# Patient Record
Sex: FEMALE | Race: WHITE | Employment: FULL TIME | URBAN - METROPOLITAN AREA
[De-identification: names, ages, dates, MRNs, and addresses within clinical notes are randomized per-mention and may not be internally consistent; named-entity substitution may affect disease eponyms.]

---

## 2017-11-15 ENCOUNTER — HOSPITAL ENCOUNTER (OUTPATIENT)
Dept: NUTRITION | Age: 62
Discharge: HOME | End: 2017-11-15
Payer: COMMERCIAL

## 2017-11-15 PROCEDURE — G0109 DIAB MANAGE TRN IND/GROUP: HCPCS

## 2017-11-16 NOTE — PROGRESS NOTES
[facility] Outpatient Diabetes Self-Management Education Program      Dsme RECORD and PROGRESS NOTE         Patient[de-identified] Will Primus : 1955         Physician: Kristina Kelley* Date: 11/15/2017     VISIT No: [x]1   []2   []3   []4   []5   []6               [x] Initial         [] Subsequent Year ACTUAL PROGRESS NOTE    A1C: 6.9  Date A1C drawn: 10-       [x] Initial     [] Updated  BMI:  58.1  Height: 67  Weight: 371       [x] Initial     [] Updated                   Weight:   [] Up    [] Down  lbs (Only if follow up visit)     Diabetes Meds:  [] None   [x] List: (Provide list at initial visit only list changes at follow up visits)  Metformin ER, Glipizide  [] No change       Other Meds:  [x] BP   [x] lipids          [] depression/anxiety        [x] other:  Thyroid, cholesterol, LOsartan, CoQ10      *Behavior Goals Set Related To:  (List at initial visit only and add any new goals, if any, from follow up visits.)  [] *Healthy eating     [] *Being active  [x] *Taking meds       [] *Reducing risks  [x] *Monitoring           [] *Healthy coping  [] *Problem-solving  []  Other:  [] Separate Behavior Form Attached (use to assess progress on previously set goals, as well)        Notes: Pt attended Class I of a 3 class DSME series.   Was attentive and showed motivation                [] PHYSICIAN ACTION REQUESTED    [] See Addendum   [x] Program schedule and visit dates            Hours furnished:      [] 0.5     [] 1    [] 1.5    [] 2     [] 2.5     [x] 3    Format:   [] Individual       [x] Group    Also present:   []  Family:     [] Friend:     [] Caregiver:        [] Other:                  [x] Individual assessment              [x] Individual education plan and needs     [x] BG goals and targets        [x] Diabetes outcomes (clinical goals)      [x] Behavior goals:               [x] set goals  [] review achievement      [x] A1c test, meaning and goal     [] SMBG*         [] SMBG schedule     [] BG meter receipt     Type/name of:                                    [] BG meter review     [x] Diabetes biology                                   [x] What increases/decreases BG           [] Diabetes identification (on body, in wallet/purse)     [x] Healthy eating*                            [x] Body weight: [] healthy wt             [] risks when overwt     [] Food + BG logs:  [] set goals              [] reviewed logs     [] Pattern management with pts logs         [] Taking meds*         [] OTC meds                [] Sick day care       [x] Being active*          [] Pattern management      [] Reducing risks: hypo & hyperglycemia       [] Reducing risks*: chronic complications     [] Healthy coping & stress management*       [] Foot care       [] Obstacles to behavior change      [] Behavior goals achievement      [] Other diabetes outcomes               [] Reducing risks*: DKA + NKHHS                [] Problem solving*        [] Foot care       [] DSM support plan      [] Pattern management     [] DSME follow-up in subsequent years      []  Monitor health status:             []  eye exam    []  Lipids            [] BP            [] urine microalbumin             [] GFR              [] other:        []  Review initial assessment; correct knowledge deficits      []  Patient--selected topics:          []  Other topic(s):           Educator Signature: Mana Thomas RD, 11/15/2017 7:22 PM

## 2017-12-06 ENCOUNTER — HOSPITAL ENCOUNTER (OUTPATIENT)
Dept: NUTRITION | Age: 62
Discharge: HOME | End: 2017-12-06
Payer: COMMERCIAL

## 2017-12-06 PROCEDURE — G0109 DIAB MANAGE TRN IND/GROUP: HCPCS

## 2017-12-07 NOTE — PROGRESS NOTES
[facility] Diabetes Self-Management Education [facility]      Dsme RECORD and PROGRESS NOTE         Patient: Coleen Adams : 1955         Physician: Rosana Florian* Date: 2017     VISIT No: []1 [x]2 []3 []4 []5 []6               [x] Initial                   [] Subsequent Year ACTUAL hours furnished: PROGRESS NOTE    A1C  Date A1C drawn:        [] Initial     [] Updated    Height:   Weight:                 Date:   BMI:        [] Initial     [] Updated                   Weight Change:  [] Up    [] Down   lbs (Only for follow-up visit)     Diabetes Meds:  [] None   [] List: (Provide list at initial visit only list changes at follow-up visits)  [x] No change   (But Pt is taking more consistently)    Other Meds:  [] BP   [] lipids          [] depression/anxiety        [] other:        Behavior Goals Set Related To:   (List at initial visit only and add any new goals, if any, from follow-up visits)  [] Healthy eating      [] Being active  [] Taking meds        [] Monitoring   [] Reducing risks     [] Healthy coping  [] Problem-solving  []  Other:    [x] Separate Behavior Form Attached (use to assess progress on previously set goals)      [] Reviewed achievement of goals    Notes: Pt attended her 2nd class of the 3 class DSME series. Had to miss Class II (was out of town). Will be meeting with me individually on Friday, 2017 to go over the lessons from last week (Medications and Monitoring) and I will also instruct her on the use of her new blood glucose meter. Pt will then have follow up nutrition counseling (MNT). Pt remains motivated.                    [] Program schedule and visit dates                  [] 0.5     [] 1    [] 1.5    [x] 2     [] 2.5     [] 3    Format:   [] Individual       [x] Group    Also present:   []  Family:     [] Friend:     [] Caregiver:        [] Other:                  [] Behavior goals:              [] set goals  [] review achievement     [] Prediabetes and Diabetes biology & diagnosis     [] BG goals and targets        [] A1c test, meaning and goal     [] Diabetes causes & risk factors        [] Purpose of Healthy Eating & tips     [] Body weight: [] healthy wt             [] risks when overwt/obese     [] Carbohydrate food sources & counting     [] Lean proteins & healthy fats     [] How foods/drinks affect BG     [] How to read a Food Label     [] Healthy Plate Method     [] Benefits of Being Active     [] Different types of exercise     [] Recommendations & safety issues for exercise     [x] Diabetes identification (on body, in wallet/purse)     [x] Obstacles to behavior change     [] S.M.A.R.T.  Goals     [] Taking medications:      [] oral   []  injections   [] insulin     [] Understanding types of meds     [] Proper storage and handling of meds     [] OTC and alternative meds     [] Purpose & benefits of Monitoring     [] Causes, prevention & treatment for hypo- & hyperglycemia     [] General guidelines for monitoring     [] SMBG and monitoring schedule     [] BG logs       [] reviewed logs     [x] Pattern management with SMBG log     [] BG meter receipt              Type/name of:                                    [] BG meter review     [x] Reducing risks to prevent chronic complications     [x]  Monitor health status:             [x]  Eye exam    [x] BP                        [x]  Lipids/heart disease            [x]  Nerve/Neuropathy             [x] Foot care              [x]  Dental      [x] Sleep                    [x]  Kidney      [] other:     [x] Obstacles to behavior change      [x] Problem solving skills to overcome barriers and challenges     [x] The process and tips for problem solving     [x] Problem solving for hypo- & hyperglycemia     [x] Sick Day Management     [x] Healthy Coping & stress management     [x] DSM support plan      [] Individual assessment     [] Individual education plan and needs     []  Review initial assessment; correct knowledge deficits      []  Patient-selected topics:          []  Other topic(s):           Educator Signature: Sheron Grant RD, 12/6/2017 7:08 PM

## 2017-12-08 ENCOUNTER — HOSPITAL ENCOUNTER (OUTPATIENT)
Dept: NUTRITION | Age: 62
Discharge: HOME | End: 2017-12-08
Payer: COMMERCIAL

## 2017-12-08 PROCEDURE — G0108 DIAB MANAGE TRN  PER INDIV: HCPCS

## 2017-12-08 NOTE — PROGRESS NOTES
[facility] Outpatient Diabetes Self-Management Education Program      Dsme RECORD and PROGRESS NOTE         Patient: Matthew Cline : 1955         Physician: Humble Salter* Date: 2017     VISIT No: []1 []2 [x]3 []4 []5 []6               [x] Initial                   [] Subsequent Year ACTUAL hours furnished: PROGRESS NOTE    A1C: Will be drawn in early   Date A1C drawn:        [] Initial     [] Updated    Height: 67  Weight: 370               Date: 2017  BMI: 57.9       [] Initial     [x] Updated                   Weight Change:  [] Up    [x] Down   1 lbs (Only for follow-up visit)     Diabetes Meds:  [] None   [] List: (Provide list at initial visit only list changes at follow-up visits)  [x] No change       Other Meds:  [] BP   [] lipids          [] depression/anxiety        [] other:        Behavior Goals Set Related To:   (List at initial visit only and add any new goals, if any, from follow-up visits)  [] Healthy eating      [] Being active  [] Taking meds        [] Monitoring   [] Reducing risks     [] Healthy coping  [] Problem-solving  []  Other:    [x] Separate Behavior Form Attached (use to assess progress on previously set goals)      [x] Reviewed achievement of goals    Notes: Pt attended her 3rd session of the 3 class DSME series. Pt had missed a class so pt attended a 1:1 education session on Monitoring and Medications. Instructed pt on the use of her meter-pt demonstrated proper technique. Pt remains motivated and will start Medical Nutrition therapy next week.                  [] Program schedule and visit dates                  [] 0.5     [] 1    [] 1.5    [x] 2     [] 2.5     [] 3    Format:   [x] Individual       [] Group    Also present:   []  Family:     [] Friend:     [] Caregiver:        [] Other:                  [x] Behavior goals:              [] set goals  [x] review achievement     [] Prediabetes and Diabetes biology & diagnosis     [x] BG goals and targets        [x] A1c test, meaning and goal     [] Diabetes causes & risk factors        [] Purpose of Healthy Eating & tips     [] Body weight: [] healthy wt             [] risks when overwt/obese     [] Carbohydrate food sources & counting     [] Lean proteins & healthy fats     [] How foods/drinks affect BG     [] How to read a Food Label     [] Healthy Plate Method     [] Benefits of Being Active     [] Different types of exercise     [] Recommendations & safety issues for exercise     [x] Diabetes identification (on body, in wallet/purse)     [] Obstacles to behavior change     [] S.M.A.R.T.  Goals     [x] Taking medications:      [x] oral   [x]  injections   [x] insulin     [x] Understanding types of meds     [x] Proper storage and handling of meds     [x] OTC and alternative meds     [x] Purpose & benefits of Monitoring     [x] Causes, prevention & treatment for hypo- & hyperglycemia     [x] General guidelines for monitoring     [x] SMBG and monitoring schedule     [] BG logs       [] reviewed logs     [x] Pattern management with SMBG log     [] BG meter receipt              Type/name of:                                    [x] BG meter review-Taught pt to use     [] Reducing risks to prevent chronic complications     []  Monitor health status:             []  Eye exam    [] BP                        []  Lipids/heart disease            []  Nerve/Neuropathy             [] Foot care              []  Dental      [] Sleep                    []  Kidney      [] other:     [] Obstacles to behavior change      [] Problem solving skills to overcome barriers and challenges     [] The process and tips for problem solving     [x] Problem solving for hypo- & hyperglycemia     [] Sick Day Management     [] Healthy Coping & stress management     [] DSM support plan      [] Individual assessment     [] Individual education plan and needs     []  Review initial assessment; correct knowledge deficits      []  Patient-selected topics:          []  Other topic(s):           Educator Signature: Qing Barnes RD, 12/8/2017 2:11 PM

## 2017-12-15 ENCOUNTER — HOSPITAL ENCOUNTER (OUTPATIENT)
Dept: NUTRITION | Age: 62
Discharge: HOME | End: 2017-12-15
Payer: COMMERCIAL

## 2017-12-15 PROCEDURE — 97802 MEDICAL NUTRITION INDIV IN: CPT

## 2017-12-15 NOTE — PROGRESS NOTES
[address] [place] [address] 51, 45 [facility], [address] [place] [address]  Phone: [phone]  Fax: [phone]   Nutrition Assessment  Medical Nutrition Therapy   Outpatient Initial Evaluation         Patient Name: Alexander Banda : 1955   Treatment Diagnosis: Diabetes, Morbid Obesity   Referral Source: Adonis Yung* Start of Care [facility]): 12/15/2017     Gender: female Age: 58 y.o. Ht: 67 in Wt:  371 lb  kg   BMI: 58.1 BMR   Male  BMR Female 58.1   Anthropometrics Assessment: Current BMI puts pt in the morbid obesity catagory     Past Medical History includes: Graves Disease, then Hypothyroidism, Hypertension, Heart Disease, Binge Eating Disorder     Pertinent Medications:   Includes Metformin 2500, Glypizide 5 mg, Atorvastatin 80 mg, Losartan,      Biochemical Data:   No results found for: HBA1C, HGBE8, PJJ3HYLI, LDS9UXDM  No results found for: CHOL, CHOLPOCT, CHOLX, CHLST, CHOLV, HDL, HDLPOC, LDL, LDLCPOC, LDLC, DLDLP, VLDLC, VLDL, TGLX, TRIGL, TRIGP, TGLPOCT, CHHD, CHHDX  No results found for: GPT, ALT, SGOT, GGT, GGTP, AP, APIT, APX, CBIL, TBIL, TBILI  No results found for: ALEJANDRA, CREAPOC, MCREA, ACREA, CREA, REFC3, REFC4  No results found for: BUN, BUNPOC, IBUN, MBUNV, BUNV  No results found for: MCACR, MCA1, MCA2, MCA3, MCAU, MCAU2, MCALPOCT     Subjective/Assessment:   Pt is a 58 y.o. Female. Single-two grown children. Lives alone. Moved to the area about a year ago from [facility] to take a job. Diagnosed with Type II DM in the early . Has struggled with her weight her entire adult life-has had residential treatment for binge eating. Also had bariatric surgery in the late . Attended Diabetes Self-Management Education classes with  last month. Has purchased a blood glucose meter and has begun testing her blood glucose since finishing up classes. No regular exercise.        Current Eating Patterns: Pt eats most of her meals out-drive through fast food during the day. Meals are excessive in carbohydrates. Occasionally purchases binge foods and eats them later in the evening-c/o hunger hitting her quickly-questions whether this is \"dumping syndrome\" leftover from her past bariatric surgery. Primarily calorie free beverages. Estimate Needs   Calories:  1550 Protein: 94 Carbs: 174 Fat: 52   Kcal/day  g/day  g/day  g/day        percent: 25  45  30               Education & Recommendations provided: -  -Reviewed the pathophysiology of Type II Diabetes and insulin resistance and the rationale for dietary modification and increased activity  -Encouraged pt not to go longer than 5 hours without eating but to space meals/snacks at least 3 hours apart.    -Educated pt on all carbohydrates found in foods and encouraged no more than 35-40 gm/meal and 15-20 gm/snack.  -Encouraged patient to food journal at least 3 times per week to capture hidden calories and carbohydrates and eating patterns.         Handouts Provided: [x]  Carbohydrates  [x]  Protein  [x]  Fiber  [x]  Serving Sizes  []  Meal and Snack Ideas  [x]  Food Journals [x]  Diabetes  [x]  Cholesterol  [x]  Sodium  [x]  Gen Nutr Guidelines  [x]  SBGM Guidelines  [x]  Others: Healthy Eating out Guidelines   Information Reviewed with: Pt   Readiness to Change Stage: []  Pre-contemplative    []  Contemplative  []  Preparation               [x]  Action                  []  Maintenance   Potential Barriers to Learning: []  Decline in memory    []  Language barrier   []  Other:  []  Emotional                  []  Limited mobility  []  Lack of motivation     [] Vision, hearing or cognitive impairment   Expected Compliance: Fair     Nutritional Goal - To promote lifestyle changes to result in:    [x]  Weight loss  [x]  Improved diabetic control  [x]  Decreased cholesterol levels  [x]  Decreased blood pressure  []  Weight maintenance []  Preventing any interactions associated with food allergies  []  Adequate weight gain toward goal weight  []  Other:        Patient Goals:  SMART goals -Pt will test her blood glucose at least 1x/d and vary the times.  -Pt will use [employer] and record her eating completely at least 3x/wk-Including 2 work days and 1 off day     Dietitian Signature: Jerri Doyle Date: 12/15/2017   Follow-up: Thursday, 1-4-2018 at 6 p.m.  Time: 1:32 PM

## 2018-01-04 ENCOUNTER — APPOINTMENT (OUTPATIENT)
Dept: NUTRITION | Age: 63
End: 2018-01-04
Payer: COMMERCIAL

## 2018-01-18 ENCOUNTER — HOSPITAL ENCOUNTER (OUTPATIENT)
Dept: NUTRITION | Age: 63
Discharge: HOME | End: 2018-01-18
Payer: COMMERCIAL

## 2018-01-18 PROCEDURE — 97803 MED NUTRITION INDIV SUBSEQ: CPT

## 2018-01-18 NOTE — PROGRESS NOTES
NUTRITION  FOLLOW-UP TREATMENT NOTE  Patient Name: Paulette Solano         Date: 2018  : 1955    YES/NO Patient  Verified  Diagnosis: Diabetes, Morbid Obesity   In time:   600             Out time:   645   Total Treatment Time (min):   45     SUBJECTIVE/ASSESSMENT    Changes in medication or medical history? Any new allergies, surgeries or procedures? YES/NO    If yes, update Summary List   No changes in medication. Pt has been testing her BG every day but usually first thing in the morning. No exercise yet. Has consistently logged her intake in [employer] and is controlling the carbohydrates at meals and snacks. Current Wt: 245.4 Previous Wt: 271 Wt Change: -25.6#     Achievement of Goals: -Pt has partially achieved her goal of testing her BG-just not varying the times.  -Pt achieved her food journaling goals      Patient Education:  [x]  Review current plan with patient   []  Other:    Handouts/  Information Provided: []  Carbohydrates  []  Protein  []  Fiber  []  Serving Sizes  []  Fluids  []  General guidelines []  Diabetes  []  Cholesterol  []  Sodium  [x]  SBGM  [x]  Food Journals  []  Others:      New Patient Goals: -Pt will try walking an extra 15 min/day  -Pt will get at least 1 other BG testing time per week. PLAN    []  Continue on current plan []  Follow-up PRN   []  Discharge due to :    [x]  Next appt: 2018 at 6 p.m.      Dietitian: Jose Cruz Robbins MS,RD,CDE    Date: 2018 Time: 5:50 PM

## 2018-03-15 ENCOUNTER — HOSPITAL ENCOUNTER (OUTPATIENT)
Dept: NUTRITION | Age: 63
Discharge: HOME | End: 2018-03-15
Payer: COMMERCIAL

## 2018-03-15 PROCEDURE — 97803 MED NUTRITION INDIV SUBSEQ: CPT

## 2018-03-15 NOTE — PROGRESS NOTES
NUTRITION  FOLLOW-UP TREATMENT NOTE  Patient Name: Fatou Villalobos         Date: 3/15/2018  : 1955    YES/NO Patient  Verified  Diagnosis: Diabetes, Mor Obesity   In time:   600             Out time:   645   Total Treatment Time (min):   45     SUBJECTIVE/ASSESSMENT    Changes in medication or medical history? Any new allergies, surgeries or procedures? YES/NO    If yes, update Summary List   Pt will be following up with her PCP at the end of March and having an updated A1C. Pt occ going too long without eating. C/o of boredom with some breakfast ideas. Future work schedule will bring extra food into her office. Current Wt: 324.8 Previous Wt: 332 Wt Change: -7.2#     Achievement of Goals: -Pt partially achieved her goal of adding different BG testing times  -Pt partially achieved her goal of walking 15 min, 4x/wk. Did get a \"Pedaler\" to pedal while sitting down     Patient Education:  [x]  Review current plan with patient   []  Other: Developed more breakfast ideas with pt containing a protein source and 15-30 gms/carbs. Handouts/  Information Provided: []  Carbohydrates  []  Protein  []  Fiber  []  Serving Sizes  []  Fluids  []  General guidelines []  Diabetes  []  Cholesterol  []  Sodium  []  SBGM  []  Food Journals  []  Others:      New Patient Goals: -Pt will Pedal or walk for 15 minutes at least 4x/week  -     PLAN    []  Continue on current plan []  Follow-up PRN   []  Discharge due to :    [x]  Next appt: 2018 at 6 p.m.      Dietitian: Bharati Berumen MS,RD,CDE    Date: 3/15/2018 Time: 6:01 PM

## 2018-05-24 ENCOUNTER — APPOINTMENT (OUTPATIENT)
Dept: NUTRITION | Age: 63
End: 2018-05-24
Payer: COMMERCIAL

## 2018-05-29 ENCOUNTER — HOSPITAL ENCOUNTER (OUTPATIENT)
Dept: NUTRITION | Age: 63
Discharge: HOME | End: 2018-05-29
Payer: COMMERCIAL

## 2018-05-29 PROCEDURE — 97803 MED NUTRITION INDIV SUBSEQ: CPT

## 2018-05-29 NOTE — PROGRESS NOTES
NUTRITION  FOLLOW-UP TREATMENT NOTE  Patient Name: Guera Bonilla         Date: 2018  : 1955    YES/NO Patient  Verified  Diagnosis: Diabetes, Mor Obesity   In time:   600             Out time:   645   Total Treatment Time (min):   45     SUBJECTIVE/ASSESSMENT    Changes in medication or medical history? Any new allergies, surgeries or procedures? YES/NO    If yes, update Summary List   Pt had a cardioversion procedure and heartbeat has been more regular. Metformin dose decreased to 2000 mg. Pt has been walking more. Pt will be doing traveling over the next month-some parties        Current Wt: 287.2 Previous Wt: 307.8 Wt Change: -20.6#     Achievement of Goals: -Pt has decreased the sodium in her lunch meals     Patient Education:  [x]  Review current plan with patient   []  Other:    Handouts/  Information Provided: []  Carbohydrates  []  Protein  []  Fiber  []  Serving Sizes  []  Fluids  []  General guidelines []  Diabetes  []  Cholesterol  []  Sodium  []  SBGM  []  Food Journals  []  Others:      New Patient Goals: -Pt will challenge herself with a \"splurge\" food at least 2 times when traveling and get back on track. PLAN    []  Continue on current plan []  Follow-up PRN   []  Discharge due to :    [x]  Next appt: 2018 at 6 p.m.      Dietitian: Oscar Olmos MS,RD,CDE    Date: 2018 Time: 5:56 PM

## 2018-07-12 ENCOUNTER — HOSPITAL ENCOUNTER (OUTPATIENT)
Dept: NUTRITION | Age: 63
Discharge: HOME | End: 2018-07-12
Payer: COMMERCIAL

## 2018-07-12 PROCEDURE — 97803 MED NUTRITION INDIV SUBSEQ: CPT

## 2018-07-12 NOTE — PROGRESS NOTES
NUTRITION  FOLLOW-UP TREATMENT NOTE  Patient Name: Emily Bran         Date: 2018  : 1955    YES/NO Patient  Verified  Diagnosis: Diabetes, Morbid Obesity   In time:   600             Out time:   645   Total Treatment Time (min):   45     SUBJECTIVE/ASSESSMENT    Changes in medication or medical history? Any new allergies, surgeries or procedures? YES/NO    If yes, update Summary List   Pt did well while traveling-did splurge a few times. Has started walking some but does not pre-plan. Has not been testing her blood glucose at all. Current Wt: 272.2 Previous Wt: 287.2 Wt Change: -15#     Achievement of Goals: -Pt was able to manage a splurge when traveling and still stay on track     Patient Education:  [x]  Review current plan with patient   []  Other:    Handouts/  Information Provided: []  Carbohydrates  []  Protein  []  Fiber  []  Serving Sizes  []  Fluids  []  General guidelines []  Diabetes  []  Cholesterol  []  Sodium  [x]  SBGM  []  Food Journals  []  Others:      New Patient Goals: -Pt will test her blood glucose a minimum of 3 x/week at 3 different times  -Pt will pre-plan her exercise for the week on  and will reach at least 90 minutes per week. PLAN    []  Continue on current plan []  Follow-up PRN   []  Discharge due to :    [x]  Next appt: 2018 at 6 p.m.      Dietitian: Anastasiya Spence MS,RD,CDE    Date: 2018 Time: 5:58 PM

## 2018-08-21 ENCOUNTER — HOSPITAL ENCOUNTER (OUTPATIENT)
Dept: NUTRITION | Age: 63
Discharge: HOME | End: 2018-08-21
Payer: COMMERCIAL

## 2018-08-21 PROCEDURE — 97803 MED NUTRITION INDIV SUBSEQ: CPT

## 2018-08-21 NOTE — PROGRESS NOTES
NUTRITION  FOLLOW-UP TREATMENT NOTE  Patient Name: Mason Boland         Date: 2018  : 1955    YES/NO Patient  Verified  Diagnosis: Diabetes, Morbid Obesity   In time:   600             Out time:   630   Total Treatment Time (min):   30     SUBJECTIVE/ASSESSMENT    Changes in medication or medical history? Any new allergies, surgeries or procedures? YES/NO    If yes, update Summary List   Pt has been doing a lot of traveling but eating in general has stayed on track. Exercised in her sister's pool. Has noticed her FBS's have been higher. Other readings have been in range. Current Wt: 263.6 Previous Wt: 272.2 Wt Change: -8.6#     Achievement of Goals: -Pt did test her blood glucose at least 3x/wk and varied the times  -Pt increased her exercise but did not reach 90 minutes every week. Patient Education:  []  Review current plan with patient   []  Other:    Handouts/  Information Provided: [x]  Carbohydrates  []  Protein  []  Fiber  []  Serving Sizes  []  Fluids  []  General guidelines []  Diabetes  []  Cholesterol  []  Sodium  []  SBGM  []  Food Journals  []  Others:      New Patient Goals: -Pt will continue to pre-plan her exercise for the week on -will try walking in the pool in the [facility] center and will log at least 90 minutes in every week until next visit     PLAN    []  Continue on current plan []  Follow-up PRN   []  Discharge due to :    [x]  Next appt: 2018 at 9 a.m.      Dietitian: Brendon Phillips MS,RD,CDE    Date: 2018 Time: 6:00 PM

## 2018-09-27 ENCOUNTER — APPOINTMENT (OUTPATIENT)
Dept: NUTRITION | Age: 63
End: 2018-09-27
Payer: COMMERCIAL

## 2018-10-11 ENCOUNTER — APPOINTMENT (OUTPATIENT)
Dept: NUTRITION | Age: 63
End: 2018-10-11
Payer: COMMERCIAL

## 2018-11-13 ENCOUNTER — HOSPITAL ENCOUNTER (OUTPATIENT)
Dept: NUTRITION | Age: 63
Discharge: HOME | End: 2018-11-13
Payer: COMMERCIAL

## 2018-11-13 PROCEDURE — 97803 MED NUTRITION INDIV SUBSEQ: CPT

## 2018-11-13 NOTE — PROGRESS NOTES
NUTRITION  FOLLOW-UP TREATMENT NOTE Patient Name: Sergio Cabral         Date: 2018 : 1955    YES/NO Patient  Verified Diagnosis: Diabetes, Morbid Obesity In time:   600             Out time:   630 Total Treatment Time (min):   30  
SUBJECTIVE/ASSESSMENT Changes in medication or medical history? Any new allergies, surgeries or procedures? YES/NO    If yes, update Summary List  
Pt's health has been stable. Was found to have a mass on her breast that may require medication (not cancer but increases her risk). Will be meeting with Oncologist next week. Has been walking for exercise. Current Wt: 243 Previous Wt: 263.6 Wt Change: -20.6# Achievement of Goals: -Pt has met her exercise goals Patient Education:  []  Review current plan with patient  
[]  Other:   
Handouts/ Information Provided: []  Carbohydrates 
[]  Protein []  Fiber 
[]  Serving Sizes []  Fluids 
[]  General guidelines []  Diabetes []  Cholesterol 
[]  Sodium []  SBGM []  Food Journals 
[]  Others:  
New Patient Goals: -Pt will add resistance training to her exercise routine at least 2x/wk PLAN 
 
[]  Continue on current plan []  Follow-up PRN  
[]  Discharge due to :   
[x]  Next appt: Thursday, 2019 at 6 p.m. Dietitian: Kuldeep Quezada MS,RD,CDE Date: 2018 Time: 6:07 PM

## 2019-01-31 ENCOUNTER — HOSPITAL ENCOUNTER (OUTPATIENT)
Dept: NUTRITION | Age: 64
Discharge: HOME | End: 2019-01-31
Payer: COMMERCIAL

## 2019-01-31 PROCEDURE — 97803 MED NUTRITION INDIV SUBSEQ: CPT

## 2019-01-31 NOTE — PROGRESS NOTES
NUTRITION  FOLLOW-UP TREATMENT NOTE Patient Name: Noel Waters         Date: 2019 : 1955    YES/NO Patient  Verified Diagnosis: Diabetes, Morbid Obesity In time:   600             Out time:   608 Total Treatment Time (min):   45 [id number] Changes in medication or medical history? Any new allergies, surgeries or procedures? YES/NO    If yes, update Summary List  
Pt has had a rough couple of months. Has made the decision to move to [place] in  and has bought a house and needs to sell her current house. Had several bingeing incidents in December and January but remained aware of her eating and even food journaled all of the binge foods. Recognizes that she is using eating to avoid anxiety. Current Wt: 231.2 Previous Wt: 243 Wt Change: -11.8# Achievement of Goals: -Pt did not achieve her goal of starting resistance training Patient Education:  [x]  Review current plan with patient  
[]  Other: Discussed pt's accomplishment of getting back on track after bingeing incidents and avoiding \"all or nothing\" thinking. Handouts/ Information Provided: []  Carbohydrates 
[]  Protein []  Fiber 
[]  Serving Sizes []  Fluids 
[]  General guidelines []  Diabetes []  Cholesterol 
[]  Sodium []  SBGM []  Food Journals 
[]  Others:  
New Patient Goals: -Pt will make sure that she does not eat closer together than every 3 hours (to avoid grazing). PLAN 
 
[]  Continue on current plan []  Follow-up PRN  
[]  Discharge due to :   
[x]  Next appt: Thursday, 3- at 6 p.m. Dietitian: Marcel Eric MS,RD,CDE Date: 2019 Time: 6:05 PM

## 2019-03-21 ENCOUNTER — HOSPITAL ENCOUNTER (OUTPATIENT)
Dept: NUTRITION | Age: 64
End: 2019-03-21

## 2019-04-11 ENCOUNTER — HOSPITAL ENCOUNTER (OUTPATIENT)
Dept: NUTRITION | Age: 64
Discharge: HOME | End: 2019-04-11
Payer: COMMERCIAL

## 2019-04-11 PROCEDURE — 97803 MED NUTRITION INDIV SUBSEQ: CPT

## 2019-04-11 NOTE — PROGRESS NOTES
NUTRITION  FOLLOW-UP TREATMENT NOTE Patient Name: Denny Mckeon         Date: 2019 : 1955    YES/NO Patient  Verified Diagnosis: Diabetes, Mor Obesity In time:   600             Out time:   454 Total Treatment Time (min):   45 [address] Changes in medication or medical history? Any new allergies, surgeries or procedures? YES/NO    If yes, update Summary List  
Pt has sold her house here and will be moving up to [place] at the end of May. This is her super busy time at work and pt admits to be progressively less mindful with her eating as the day goes on (from about 3p.m.on)  Continues to food journal even with binges which indicates still some mindfulness. Current Wt: 237.2 Previous Wt: 231.2 Wt Change: + 6# Achievement of Goals: -Pt had not achieved her goal of not eating closer together than 3 hrs (especially in the evening on binge nights) Patient Education:  []  Review current plan with patient  
[]  Other: Discussed in depth the myth of perfection with eating-importance of remaining mindful later in the day. Handouts/ Information Provided: []  Carbohydrates 
[]  Protein []  Fiber 
[]  Serving Sizes []  Fluids 
[]  General guidelines []  Diabetes []  Cholesterol 
[]  Sodium []  SBGM []  Food Journals 
[]  Others:  
New Patient Goals: -Pt will set her phone alarm for 4 p.m every day-will check in and evaluate when she ate last and when she needs to eat next. PLAN 
 
[]  Continue on current plan []  Follow-up PRN  
[]  Discharge due to :   
[x]  Next appt: 2019 at 6:30 p.m. Dietitian: Carol Eisenberg MS,RD,CDE Date: 2019 Time: 6:00 PM